# Patient Record
Sex: MALE | Race: WHITE | NOT HISPANIC OR LATINO | Employment: FULL TIME
[De-identification: names, ages, dates, MRNs, and addresses within clinical notes are randomized per-mention and may not be internally consistent; named-entity substitution may affect disease eponyms.]

---

## 2017-03-02 ENCOUNTER — RECORDS - HEALTHEAST (OUTPATIENT)
Dept: ADMINISTRATIVE | Facility: OTHER | Age: 37
End: 2017-03-02

## 2017-03-09 ENCOUNTER — COMMUNICATION - HEALTHEAST (OUTPATIENT)
Dept: TELEHEALTH | Facility: CLINIC | Age: 37
End: 2017-03-09

## 2017-03-09 ENCOUNTER — OFFICE VISIT - HEALTHEAST (OUTPATIENT)
Dept: FAMILY MEDICINE | Facility: CLINIC | Age: 37
End: 2017-03-09

## 2017-03-09 DIAGNOSIS — R25.1 TREMOR: ICD-10-CM

## 2017-03-09 DIAGNOSIS — K70.11 ALCOHOLIC HEPATITIS WITH ASCITES (H): ICD-10-CM

## 2017-03-09 DIAGNOSIS — Z11.3 SCREEN FOR STD (SEXUALLY TRANSMITTED DISEASE): ICD-10-CM

## 2017-03-09 DIAGNOSIS — I10 ESSENTIAL HYPERTENSION: ICD-10-CM

## 2017-03-09 DIAGNOSIS — Z72.0 TOBACCO ABUSE: ICD-10-CM

## 2017-03-09 DIAGNOSIS — Z00.00 WELL ADULT EXAM: ICD-10-CM

## 2017-03-09 DIAGNOSIS — F41.9 ANXIETY: ICD-10-CM

## 2017-03-09 DIAGNOSIS — F11.10 OPIATE ABUSE, EPISODIC (H): ICD-10-CM

## 2017-03-09 DIAGNOSIS — F33.2 SEVERE EPISODE OF RECURRENT MAJOR DEPRESSIVE DISORDER, WITHOUT PSYCHOTIC FEATURES (H): ICD-10-CM

## 2017-03-09 DIAGNOSIS — F10.10 ALCOHOL ABUSE: ICD-10-CM

## 2017-03-09 DIAGNOSIS — N50.89 MASS OF SCROTUM: ICD-10-CM

## 2017-03-09 LAB
CHOLEST SERPL-MCNC: 239 MG/DL
FASTING STATUS PATIENT QL REPORTED: YES
HDLC SERPL-MCNC: 53 MG/DL
HIV 1+2 AB+HIV1 P24 AG SERPL QL IA: NEGATIVE
LDLC SERPL CALC-MCNC: 166 MG/DL
TRIGL SERPL-MCNC: 101 MG/DL

## 2017-03-09 ASSESSMENT — MIFFLIN-ST. JEOR: SCORE: 1624.1

## 2017-03-10 ENCOUNTER — HOSPITAL ENCOUNTER (OUTPATIENT)
Dept: ULTRASOUND IMAGING | Facility: HOSPITAL | Age: 37
Discharge: HOME | End: 2017-03-10
Attending: FAMILY MEDICINE

## 2017-03-10 DIAGNOSIS — N50.9 DISORDER OF MALE GENITAL ORGANS, UNSPECIFIED: ICD-10-CM

## 2017-03-10 DIAGNOSIS — N50.89 MASS OF SCROTUM: ICD-10-CM

## 2017-03-10 LAB — SYPHILIS RPR SCREEN - HISTORICAL: NORMAL

## 2017-03-13 ENCOUNTER — COMMUNICATION - HEALTHEAST (OUTPATIENT)
Dept: FAMILY MEDICINE | Facility: CLINIC | Age: 37
End: 2017-03-13

## 2017-03-14 ENCOUNTER — COMMUNICATION - HEALTHEAST (OUTPATIENT)
Dept: FAMILY MEDICINE | Facility: CLINIC | Age: 37
End: 2017-03-14

## 2017-03-15 ENCOUNTER — COMMUNICATION - HEALTHEAST (OUTPATIENT)
Dept: FAMILY MEDICINE | Facility: CLINIC | Age: 37
End: 2017-03-15

## 2017-03-16 ENCOUNTER — RECORDS - HEALTHEAST (OUTPATIENT)
Dept: ADMINISTRATIVE | Facility: OTHER | Age: 37
End: 2017-03-16

## 2017-03-20 ENCOUNTER — HOSPITAL ENCOUNTER (OUTPATIENT)
Dept: ULTRASOUND IMAGING | Facility: HOSPITAL | Age: 37
Discharge: HOME | End: 2017-03-20

## 2017-03-20 DIAGNOSIS — K70.11 ALCOHOLIC HEPATITIS WITH ASCITES (H): ICD-10-CM

## 2017-06-13 ENCOUNTER — RECORDS - HEALTHEAST (OUTPATIENT)
Dept: ADMINISTRATIVE | Facility: OTHER | Age: 37
End: 2017-06-13

## 2017-06-14 ENCOUNTER — RECORDS - HEALTHEAST (OUTPATIENT)
Dept: ADMINISTRATIVE | Facility: OTHER | Age: 37
End: 2017-06-14

## 2020-08-28 ENCOUNTER — VIRTUAL VISIT (OUTPATIENT)
Dept: FAMILY MEDICINE | Facility: OTHER | Age: 40
End: 2020-08-28
Payer: COMMERCIAL

## 2020-08-28 ENCOUNTER — AMBULATORY - HEALTHEAST (OUTPATIENT)
Dept: FAMILY MEDICINE | Facility: CLINIC | Age: 40
End: 2020-08-28

## 2020-08-28 DIAGNOSIS — Z20.822 SUSPECTED COVID-19 VIRUS INFECTION: ICD-10-CM

## 2020-08-28 PROCEDURE — 99421 OL DIG E/M SVC 5-10 MIN: CPT | Performed by: NURSE PRACTITIONER

## 2020-08-28 NOTE — PROGRESS NOTES
"Date: 2020 10:51:07  Clinician: Mitzy Wyatt  Clinician NPI: [id number]  Patient: Quinton Franks  Patient : 1980  Patient Address: [address] [place] [address]  Patient Phone: [phone]  Visit Protocol: URI  Patient Summary:  Quinton is a 40 year old ( : 1980 ) male who initiated a Visit for COVID-19 (Coronavirus) evaluation and screening. When asked the question \"Please sign me up to receive news, health information and promotions from [employer].\", Quinton responded \"No\".    Quinton states his symptoms started gradually 3-4 days ago.   His symptoms consist of a headache, malaise, a cough, nasal congestion, and myalgia. He is experiencing mild difficulty breathing with activities but can speak normally in full sentences.   Symptom details     Nasal secretions: The color of his mucus is white and yellow.    Cough: Quinton coughs every 5-10 minutes and his cough is not more bothersome at night. Phlegm does not come into his throat when he coughs. He does not believe his cough is caused by post-nasal drip.     Headache: He states the headache is mild (1-3 on a 10 point pain scale).      Quinton denies having ear pain, chills, fever, wheezing, sore throat, teeth pain, ageusia, diarrhea, vomiting, rhinitis, nausea, anosmia, and facial pain or pressure. He also denies having a sinus infection within the past year, having recent facial or sinus surgery in the past 60 days, taking antibiotic medication in the past month, and double sickening (worsening symptoms after initial improvement).   Precipitating events  He has not recently been exposed to someone with influenza. Quinton has been in close contact with the following high risk individuals: children under the age of 5 and adults 65 or older.   Pertinent COVID-19 (Coronavirus) information  In the past 14 days, Quinton has not worked in a congregate living setting.   He does not work or volunteer as healthcare worker or a  and does not " work or volunteer in a healthcare facility.   Quinton also has not lived in a congregate living setting in the past 14 days. He does not live with a healthcare worker.   Quinton has had a close contact with a laboratory-confirmed COVID-19 patient within 14 days of symptom onset.   Since December 2019, Quinton and has not had upper respiratory infection or influenza-like illness. Has not been diagnosed with lab-confirmed COVID-19 test   Pertinent medical history  Quinton needs a return to work/school note.   Weight: 195 lbs   Quinton smokes or uses smokeless tobacco.   Weight: 195 lbs    MEDICATIONS: No current medications, ALLERGIES: NKDA  Clinician Response:  Dear DULCE Alcantara  Your symptoms show that you may have coronavirus (COVID-19). This illness can cause fever, cough and trouble breathing. Many people get a mild case and get better on their own. Some people can get very sick.  What should I do?  We would like to test you for this virus.   1. Please call [phone] to schedule your visit. Explain that you were referred by [facility] to have a COVID-19 test. Be ready to share your [facility] visit ID number.  The following will serve as your written order for this COVID Test, ordered by me, for the indication of suspected COVID [Z20.828]: The test will be ordered in [employer], our electronic health record, after you are scheduled. It will show as ordered and authorized by Konstantin Kitchen MD.  Order: COVID-19 (Coronavirus) PCR for SYMPTOMATIC testing from [facility].      2. When it's time for your COVID test:  Stay at least 6 feet away from others. (If someone will drive you to your test, stay in the backseat, as far away from the  as you can.)   Cover your mouth and nose with a mask, tissue or washcloth.  Go straight to the testing site. Don't make any stops on the way there or back.      3.Starting now: Stay home and away from others (self-isolate) until:   You've had no fever---and no medicine that reduces fever---for one full day  "(24 hours). And...   Your other symptoms have gotten better. For example, your cough or breathing has improved. And...   At least 10 days have passed since your symptoms started.       During this time, don't leave the house except for testing or medical care.   Stay in your own room, even for meals. Use your own bathroom if you can.   Stay away from others in your home. No hugging, kissing or shaking hands. No visitors.  Don't go to work, school or anywhere else.    Clean \"high touch\" surfaces often (doorknobs, counters, handles, etc.). Use a household cleaning spray or wipes. You'll find a full list of  on the EPA website: www.epa.gov/pesticide-registration/list-n-disinfectants-use-against-sars-cov-2.   Cover your mouth and nose with a mask, tissue or washcloth to avoid spreading germs.  Wash your hands and face often. Use soap and water.  Caregivers in these groups are at risk for severe illness due to COVID-19:  o People 65 years and older  o People who live in a nursing home or long-term care facility  o People with chronic disease (lung, heart, cancer, diabetes, kidney, liver, immunologic)  o People who have a weakened immune system, including those who:   Are in cancer treatment  Take medicine that weakens the immune system, such as corticosteroids  Had a bone marrow or organ transplant  Have an immune deficiency  Have poorly controlled HIV or AIDS  Are obese (body mass index of 40 or higher)  Smoke regularly   o Caregivers should wear gloves while washing dishes, handling laundry and cleaning bedrooms and bathrooms.  o Use caution when washing and drying laundry: Don't shake dirty laundry, and use the warmest water setting that you can.  o For more tips, go to www.cdc.gov/coronavirus/2019-ncov/downloads/10Things.pdf.    4.Sign up for [employer]. We know it's scary to hear that you might have COVID-19. We want to track your symptoms to make sure you're okay over the next 2 weeks. Please look for an " email from [employer]---this is a free, online program that we'll use to keep in touch. To sign up, follow the link in the email. Learn more at [email or web address]  How can I take care of myself?   Get lots of rest. Drink extra fluids (unless a doctor has told you not to).   Take Tylenol (acetaminophen) for fever or pain. If you have liver or kidney problems, ask your family doctor if it's okay to take Tylenol.   Adults can take either:    650 mg (two 325 mg pills) every 4 to 6 hours, or...   1,000 mg (two 500 mg pills) every 8 hours as needed.    Note: Don't take more than 3,000 mg in one day. Acetaminophen is found in many medicines (both prescribed and over-the-counter medicines). Read all labels to be sure you don't take too much.   For children, check the Tylenol bottle for the right dose. The dose is based on the child's age or weight.    If you have other health problems (like cancer, heart failure, an organ transplant or severe kidney disease): Call your specialty clinic if you don't feel better in the next 2 days.       Know when to call 911. Emergency warning signs include:    Trouble breathing or shortness of breath Pain or pressure in the chest that doesn't go away Feeling confused like you haven't felt before, or not being able to wake up Bluish-colored lips or face.  Where can I get more information?   [facility] -- About COVID-19: [email or web address]   CDC -- What to Do If You're Sick: www.cdc.gov/coronavirus/2019-ncov/about/steps-when-sick.html   CDC -- Ending Home Isolation: www.cdc.gov/coronavirus/2019-ncov/hcp/disposition-in-home-patients.html   CDC -- Caring for Someone: www.cdc.gov/coronavirus/2019-ncov/if-you-are-sick/care-for-someone.html   [facility] -- Interim Guidance for Hospital Discharge to Home: www.health.[facility].[place].us/diseases/coronavirus/hcp/hospdischarge.pdf   [facility] clinical trials (COVID-19 research studies):  [email or web address]-clinical-trials    Below are the COVID-19 hotlines at the [place] Department of Health ([facility]). Interpreters are available.    For health questions: Call 840-107-3255 or 1-607.878.1613 (7 a.m. to 7 p.m.) For questions about schools and childcare: Call 148-228-3074 or 1-131.322.1205 (7 a.m. to 7 p.m.)    Diagnosis: Other malaise  Diagnosis ICD: R53.81

## 2020-08-29 ENCOUNTER — AMBULATORY - HEALTHEAST (OUTPATIENT)
Dept: FAMILY MEDICINE | Facility: CLINIC | Age: 40
End: 2020-08-29

## 2020-08-29 DIAGNOSIS — Z20.822 SUSPECTED COVID-19 VIRUS INFECTION: ICD-10-CM

## 2020-09-01 ENCOUNTER — COMMUNICATION - HEALTHEAST (OUTPATIENT)
Dept: SCHEDULING | Facility: CLINIC | Age: 40
End: 2020-09-01

## 2021-03-05 ENCOUNTER — OFFICE VISIT - HEALTHEAST (OUTPATIENT)
Dept: FAMILY MEDICINE | Facility: CLINIC | Age: 41
End: 2021-03-05

## 2021-03-05 DIAGNOSIS — K21.9 GASTROESOPHAGEAL REFLUX DISEASE WITHOUT ESOPHAGITIS: ICD-10-CM

## 2021-03-05 DIAGNOSIS — Z72.0 TOBACCO ABUSE: ICD-10-CM

## 2021-03-05 DIAGNOSIS — Z23 NEED FOR TETANUS BOOSTER: ICD-10-CM

## 2021-03-05 DIAGNOSIS — F10.11 ALCOHOL ABUSE, IN REMISSION: ICD-10-CM

## 2021-03-05 ASSESSMENT — MIFFLIN-ST. JEOR: SCORE: 1798.17

## 2021-05-18 ENCOUNTER — RECORDS - HEALTHEAST (OUTPATIENT)
Dept: ADMINISTRATIVE | Facility: OTHER | Age: 41
End: 2021-05-18

## 2021-05-18 ENCOUNTER — OFFICE VISIT - HEALTHEAST (OUTPATIENT)
Dept: FAMILY MEDICINE | Facility: CLINIC | Age: 41
End: 2021-05-18

## 2021-05-18 DIAGNOSIS — Z00.00 ROUTINE MEDICAL EXAM: ICD-10-CM

## 2021-05-18 DIAGNOSIS — F10.11 ALCOHOL ABUSE, IN REMISSION: ICD-10-CM

## 2021-05-18 DIAGNOSIS — E78.1 PURE HYPERGLYCERIDEMIA: ICD-10-CM

## 2021-05-18 DIAGNOSIS — I10 ESSENTIAL HYPERTENSION: ICD-10-CM

## 2021-05-18 DIAGNOSIS — K21.9 GASTROESOPHAGEAL REFLUX DISEASE WITHOUT ESOPHAGITIS: ICD-10-CM

## 2021-05-18 LAB
ALBUMIN SERPL-MCNC: 4.3 G/DL (ref 3.5–5)
ALP SERPL-CCNC: 103 U/L (ref 45–120)
ALT SERPL W P-5'-P-CCNC: 31 U/L (ref 0–45)
ANION GAP SERPL CALCULATED.3IONS-SCNC: 12 MMOL/L (ref 5–18)
AST SERPL W P-5'-P-CCNC: 26 U/L (ref 0–40)
BILIRUB SERPL-MCNC: 0.5 MG/DL (ref 0–1)
BUN SERPL-MCNC: 14 MG/DL (ref 8–22)
CALCIUM SERPL-MCNC: 9.7 MG/DL (ref 8.5–10.5)
CHLORIDE BLD-SCNC: 104 MMOL/L (ref 98–107)
CHOLEST SERPL-MCNC: 283 MG/DL
CO2 SERPL-SCNC: 23 MMOL/L (ref 22–31)
CREAT SERPL-MCNC: 0.9 MG/DL (ref 0.7–1.3)
FASTING STATUS PATIENT QL REPORTED: YES
GFR SERPL CREATININE-BSD FRML MDRD: >60 ML/MIN/1.73M2
GLUCOSE BLD-MCNC: 100 MG/DL (ref 70–125)
HBA1C MFR BLD: 5.5 %
HDLC SERPL-MCNC: 35 MG/DL
LDLC SERPL CALC-MCNC: 179 MG/DL
LDLC SERPL CALC-MCNC: 182 MG/DL
POTASSIUM BLD-SCNC: 4.9 MMOL/L (ref 3.5–5)
PROT SERPL-MCNC: 7.4 G/DL (ref 6–8)
SODIUM SERPL-SCNC: 139 MMOL/L (ref 136–145)
TRIGL SERPL-MCNC: 331 MG/DL
TSH SERPL DL<=0.005 MIU/L-ACNC: 1.56 UIU/ML (ref 0.3–5)

## 2021-05-18 ASSESSMENT — ANXIETY QUESTIONNAIRES
6. BECOMING EASILY ANNOYED OR IRRITABLE: NOT AT ALL
4. TROUBLE RELAXING: NOT AT ALL
GAD7 TOTAL SCORE: 0
3. WORRYING TOO MUCH ABOUT DIFFERENT THINGS: NOT AT ALL
3. WORRYING TOO MUCH ABOUT DIFFERENT THINGS: NOT AT ALL
4. TROUBLE RELAXING: NOT AT ALL
7. FEELING AFRAID AS IF SOMETHING AWFUL MIGHT HAPPEN: NOT AT ALL
1. FEELING NERVOUS, ANXIOUS, OR ON EDGE: NOT AT ALL
6. BECOMING EASILY ANNOYED OR IRRITABLE: NOT AT ALL
7. FEELING AFRAID AS IF SOMETHING AWFUL MIGHT HAPPEN: NOT AT ALL
5. BEING SO RESTLESS THAT IT IS HARD TO SIT STILL: NOT AT ALL
2. NOT BEING ABLE TO STOP OR CONTROL WORRYING: NOT AT ALL
2. NOT BEING ABLE TO STOP OR CONTROL WORRYING: NOT AT ALL
5. BEING SO RESTLESS THAT IT IS HARD TO SIT STILL: NOT AT ALL
1. FEELING NERVOUS, ANXIOUS, OR ON EDGE: NOT AT ALL

## 2021-05-18 ASSESSMENT — PATIENT HEALTH QUESTIONNAIRE - PHQ9: SUM OF ALL RESPONSES TO PHQ QUESTIONS 1-9: 2

## 2021-05-18 ASSESSMENT — MIFFLIN-ST. JEOR: SCORE: 1786.71

## 2021-05-27 VITALS — HEIGHT: 68 IN

## 2021-05-27 VITALS — HEART RATE: 72 BPM | SYSTOLIC BLOOD PRESSURE: 112 MMHG | DIASTOLIC BLOOD PRESSURE: 82 MMHG

## 2021-05-27 VITALS — WEIGHT: 197.8 LBS | BODY MASS INDEX: 30.08 KG/M2

## 2021-05-30 VITALS — BODY MASS INDEX: 24.81 KG/M2 | HEIGHT: 68 IN | WEIGHT: 163.7 LBS

## 2021-06-09 NOTE — PROGRESS NOTES
Assessment:      Healthy male exam.    1. Well adult exam  Lipid Cascade   2. Alcohol abuse  Comprehensive Metabolic Panel   3. Alcoholic hepatitis with ascites     4. Severe episode of recurrent major depressive disorder, without psychotic features     5. Anxiety     6. Essential hypertension     7. Tremor     8. Mass of scrotum  US Scrotum and Testicles W Duplex Ltd   9. Screen for STD (sexually transmitted disease)  Chlamydia trachomatis & Neisseria gonorrhoeae, Amplified Detection    HIV Antigen/Antibody Screening [place]    Syphilis Screen, Cascade   10. Tobacco abuse     11. Opiate abuse, episodic       The following are part of a depression follow up plan for the patient:  under care of mental health team  The following high BMI interventions were performed this visit: encouragement to exercise  I have counseled the patient for tobacco cessation and the follow up will occur  at the next visit.     Plan:      I recommended that he continue the inpatient rehab program to [facility].  He reports that he is seeing mental health providers that help manage his medications.  I recommended that we check the above labs including STD screening labs.  I also recommended that he have an ultrasound of the mass in his left scrotum.  His blood pressure appears stable.  We will attempt to get old records to review.     All questions answered.  Discussed healthy lifestyle modifications.  Follow up as needed.     Subjective:      Quinton Franks is a 36 y.o. male who presents for an annual exam. The patient reports that there is not domestic violence in his life.  He has a medical history significant for alcohol and opiate abuse.  He reports that he is currently doing inpatient rehab at [facility] in [place].  He has been there for 1 week.  He describes being at [facility] for a period of time last fall, but relapsed.  He reports that his medical history significant for anxiety, depression, hypertension, tremor and alcoholic  "hepatitis with ascites that was diagnosed last fall.  He reports that 3 L of fluid were drained last October.  He sees GI every 3 months.  He has a follow-up appointment with him next week.  He is sexually active and has never had STD screening before.  He reports that hepatitis screening was negative in the past.  He is also concerned about a mass that he noticed in the left scrotum a few weeks ago.  It is not tender.  It has not significantly changed in size.    Social history: He has been drinking alcohol heavily since his early 20s.  He reports very heavy drinking \"all day everyday\" for the past 5 years.  She used to work as a .    Healthy Habits:   Regular Exercise: Yes  Sunscreen Use: N/A  Healthy Diet: Yes  Dental Visits Regularly: No  Seat Belt: Yes  Sexually active: Yes  Monthly Self Testicular Exams:  Yes  Hemoccults: No  Flex Sig: No  Colonoscopy: No  Lipid Profile: Yes  Glucose Screen: Yes  Prevention of Osteoporosis: No  Last Dexa: N/A        There is no immunization history on file for this patient.  Immunization status: Refuses Immunization. He is due for Tdap and influenza.    No exam data present    Current Outpatient Prescriptions   Medication Sig Dispense Refill     FLUoxetine (PROZAC) 40 MG capsule   0     gabapentin (NEURONTIN) 600 MG tablet 600 mg. Take three times daily  0     propranolol (INDERAL) 10 MG tablet   0     SUBOXONE 8-2 mg Film per sublingual film   0     No current facility-administered medications for this visit.      No past medical history on file.  No past surgical history on file.  Review of patient's allergies indicates no known allergies.  Family History   Problem Relation Age of Onset     Hypertension Mother      Alcohol abuse Mother      Depression Mother      Hypertension Father      Social History     Social History     Marital status: Single     Spouse name: N/A     Number of children: N/A     Years of education: N/A     Occupational History     Not on file. " "    Social History Main Topics     Smoking status: Current Every Day Smoker     Smokeless tobacco: Not on file     Alcohol use Not on file     Drug use: Not on file     Sexual activity: Not on file     Other Topics Concern     Not on file     Social History Narrative     No narrative on file       Review of Systems  General:  Denies problem  Eyes: Denies problem  Ears/Nose/Throat: Denies problem  Cardiovascular: Denies problem  Respiratory:  Denies problem  Gastrointestinal:  Denies problem  Genitourinary: Denies problem  Musculoskeletal:  Denies problem  Skin: Denies problem  Neurologic: Denies problem  Psychiatric: Denies problem  Endocrine: Denies problem  Heme/Lymphatic: Denies problem   Allergic/Immunologic: Denies problem        Objective:     Vitals:    03/09/17 1231   BP: 122/84   Pulse: 84   Resp: 18   Weight: 163 lb 11.2 oz (74.3 kg)   Height: 5' 7.5\" (1.715 m)     Body mass index is 25.26 kg/(m^2).    Physical  General Appearance: Alert, cooperative, no distress, appears stated age  Head: Normocephalic, without obvious abnormality, atraumatic  Eyes: PERRL, conjunctiva/corneas clear, EOM's intact  Ears: Normal TM's and external ear canals, both ears  Nose: Nares normal, septum midline,mucosa normal, no drainage  Throat: Lips, mucosa, and tongue normal; teeth and gums normal  Neck: Supple, symmetrical, trachea midline, no adenopathy;  thyroid: not enlarged, symmetric, no tenderness/mass/nodules  Back: Symmetric, no curvature, ROM normal, no CVA tenderness  Lungs: Clear to auscultation bilaterally, respirations unlabored  Heart: Regular rate and rhythm, S1 and S2 normal, no murmur, rub, or gallop,  Chest: There are symmetric palpable nodules under both nipples which are movable and nontender  Abdomen: Soft, non-tender, bowel sounds active all four quadrants,  no masses, no organomegaly  Genitourinary: Penis normal. Right testis is descended. Left testis is descended.  There is a palpable mass that is movable " in the posterior left upper scrotum which is nontender to palpation.  No hernias palpated  Musculoskeletal: Normal range of motion. No joint swelling or deformity.   Extremities: Extremities normal, atraumatic, no cyanosis or edema.  Tremor present  Skin: Skin color, texture, turgor normal, no rashes or lesions  Lymph nodes: Cervical, supraclavicular, and axillary nodes normal  Neurologic: He is alert. He has normal reflexes.   Psychiatric: He has a normal mood and affect.

## 2021-06-15 PROBLEM — F10.11 ALCOHOL ABUSE, IN REMISSION: Status: ACTIVE | Noted: 2017-03-09

## 2021-06-15 PROBLEM — Z72.0 TOBACCO ABUSE: Status: ACTIVE | Noted: 2017-03-09

## 2021-06-15 PROBLEM — F41.9 ANXIETY: Status: ACTIVE | Noted: 2017-03-09

## 2021-06-15 PROBLEM — I10 ESSENTIAL HYPERTENSION: Status: ACTIVE | Noted: 2017-03-09

## 2021-06-15 PROBLEM — R25.1 TREMOR: Status: ACTIVE | Noted: 2017-03-09

## 2021-06-15 NOTE — PROGRESS NOTES
"    Assessment & Plan     Gastroesophageal reflux disease without esophagitis    We will have him continue the esomeprazole daily.  He may very well have a bit of gastritis or reflux going on here.  We talked about how the medication works and how he may need to take it for a while to get full effect of it.  He will follow-up with me the next day visit and see how he is doing with that.    Tobacco abuse    We did talk about smoking a bit and how we can talk about that down the road when he would like to try to get off of that.  He has done a nice job of being off of alcohol now and being off of opioids so at some point it would be nice to get him off of this as well.    Alcohol abuse, in remission    As mentioned he is sober now for several years and he was congratulated on that.  He had had some medical problems related to his drug and alcohol use and those seem to be improving.    Need for tetanus booster    - Tdap vaccine,  8yo or older,  IM      Review of prior external note(s) from - other providers    Tobacco Cessation:   reports that he has been smoking. He has never used smokeless tobacco.  I have counseled the patient for tobacco cessation and the follow up will occur  at the next visit.  BMI:   Estimated body mass index is 30.82 kg/m  as calculated from the following:    Height as of this encounter: 5' 7.75\" (1.721 m).    Weight as of this encounter: 201 lb 3.2 oz (91.3 kg).       No follow-ups on file.    Enrique Sands MD  [facility]   Quinton Franks is 40 y.o. and presents today for the following health issues   HPI     Patient here to establish care with us and to follow-up from a recent visit to the ED.  He went in with some abdominal discomfort dilated about a test on him and came up with that he probably has some gastroesophageal reflux and/or gastritis.  He was put on some Nexium and has done well with that and he already starts to notice a bit of an " "improvement with that.  He knows of no food pattern to this issue.  He has had no bowel or bladder changes.  He said no side effects or problems with the Nexium.    We also spent some time reviewing his history today which includes opioid abuse and alcohol abuse but he has been clean and sober for a while now and he was congratulated on that and has no intention of going back.    He does still smoke cigarettes daily and hopes hopeful at some point that he will be able to get off of those as well.    Review of Systems    Patient is new patient to the clinic. Please see past medical history, surgical history, social history and family history, all of which were completed in their entirety today.       Objective    /70 (Patient Site: Left Arm, Patient Position: Sitting, Cuff Size: Adult Regular)   Pulse 81   Ht 5' 7.75\" (1.721 m)   Wt 201 lb 3.2 oz (91.3 kg)   SpO2 98%   BMI 30.82 kg/m    Body mass index is 30.82 kg/m .  Physical Exam    Well-appearing male no acute distress.  Vital signs as noted.  Chest clear to auscultation.  Heart regular rate and rhythm.  Abdomen soft nontender nondistended muscles present all quadrants.  Extremities without edema.            "

## 2021-06-16 PROBLEM — K21.9 GASTROESOPHAGEAL REFLUX DISEASE WITHOUT ESOPHAGITIS: Status: ACTIVE | Noted: 2021-03-05

## 2021-06-19 ENCOUNTER — HEALTH MAINTENANCE LETTER (OUTPATIENT)
Age: 41
End: 2021-06-19

## 2021-06-30 NOTE — PROGRESS NOTES
Progress Notes by Enrique Sands MD at 5/18/2021  8:00 AM     Author: Enrique Sands MD Service: -- Author Type: Physician    Filed: 5/20/2021  6:53 AM Encounter Date: 5/18/2021 Status: Signed    : Enrique Sands MD (Physician)       MALE PREVENTATIVE EXAM    Assessment and Plan:     Patient has been advised of split billing requirements and indicates understanding: Yes    1. Routine medical exam    Generally the patient is doing very well.  We discussed healthy lifestyles, including adequate exercise (3-4 times a week for 20-30 minutes), and a healthy diet.  Patient should return for annual physicals, and we can also see them here as needed.       2. Essential Hypertriglyceridemia    We get some labs today as listed up below and see where his cholesterol is at.  He has had a very high triglyceride in the past, so we get an LDL direct so we can assess that risk factor a bit better.  We can follow-up on the results of the become available.  We talked about diet and exercise as an important part of all of this as well.      - Lipid Profile  - Thyroid Stimulating Hormone (TSH)  - Glycosylated Hemoglobin A1c  - LDL Cholesterol, Direct    3. Alcohol abuse, in remission    He was congratulated on his sobriety and he feels pretty pleased about that.  No intentions of going back and feels quite good about it.      - Comprehensive Metabolic Panel    4. Essential hypertension    Blood pressure is very good today with no medications on board.  We can continue to watch this going forward and treat accordingly.    5. Gastroesophageal reflux disease without esophagitis    The Nexium seems to help his reflux well, so we will continue that medicine as well.    Next follow up:  No follow-ups on file.    Immunization Review  Adult Imm Review: No immunizations due today      I discussed the following with the patient:   Adult Healthy Living: Importance of regular exercise  Healthy nutrition  Getting adequate sleep  Stress  management  Use of seat belts        Subjective:   Chief Complaint: Quinton Franks is an 41 y.o. male here for a preventative health visit.    Patient has been advised of split billing requirements and indicates understanding: Yes  HPI: Patient here today for a physical.  He is generally doing pretty well and has no major concerns or problems.  He does have a couple of chronic issues that we did cover today and advised.    He has essential hypertriglyceridemia and has not had this checked in quite some time.  He has known that his triglycerides have been high but has not really had an LDL to assess that risk factor.  He tries to eat well but knows that it is in his family significantly.    He has quit drinking and he is pleased about that.  He has no intentions of going back to doing that as he is trying to change around his life a bit to get things a bit better for himself.    He has essential hypertension but does not actually require any medication since he has stopped drinking.    He also has some reflux and the Nexium really works well for that.    Healthy Habits  Are you taking a daily aspirin? No  Do you typically exercising at least 40 min, 3-4 times per week?  Yes  Do you usually eat at least 4 servings of fruit and vegetables a day, include whole grains and fiber and avoid regularly eating high fat foods? NO  Have you had an eye exam in the past two years? NO  Do you see a dentist twice per year? Yes  Do you have any concerns regarding sleep? No    Safety Screen  If you own firearms, are they secured in a locked gun cabinet or with trigger locks? Yes  Do you feel you are safe where you are living?: Yes (5/18/2021  7:26 AM)  Do you feel you are safe in your relationship(s)?: Yes (5/18/2021  7:26 AM)      Review of Systems:  Please see above.  The rest of the review of systems are negative for all systems.     Cancer Screening     Patient has no health maintenance due at this time          Patient Care  "Team:  Enrique Sands MD as PCP - General (Family Medicine)  Enrique Sands MD as Assigned PCP        History     Reviewed By Date/Time Sections Reviewed    Enrique Sands MD 5/18/2021  8:20 AM Medical, Surgical, Tobacco, Alcohol, Drug Use, Sexual Activity, Family    Clari Dang [facility] 5/18/2021  7:26 AM Tobacco            Objective:   Vital Signs:   Visit Vitals  /82 (Patient Site: Left Arm, Patient Position: Sitting, Cuff Size: Adult Large)   Pulse 72   Ht 5' 8\" (1.727 m)   Wt 197 lb 12.8 oz (89.7 kg)   BMI 30.08 kg/m           PHYSICAL EXAM    Well developed, well nourished, no acute distress.  HEENT: normocephalic/atraumatic, PERRLA/EOMI, TMs: Gray, normal light reflex, no nasal discharge.  Oral mucosa: no erythema/exudate  Neck: No LAD/masses/thyromegaly/bruits  Lungs: clear bilaterally  Heart: regular rate and rhythm, no murmurs/gallops/rubs.  Abdomen: Normal bowel sounds, soft, non-tender, non-distended, no masses, neg Aguila's/McBurney's, no rebound/guarding  Genital: Bilaterally descended testes, no masses, non-tender, no hernia.  No urethral discharge or erythema.  No lesions, normal phallus.  Lymphatics: no supraclavicular/axillary/epitrochlear/inguinal LAD. No edema.  Neuro: A&O x 3, CN II-XII intact, strength 5/5, reflexes symmetric, sensory intact to light touch.  Psych: Behavior appropriate, engaging.  Thought processes congruent, non-tangential.  Musculoskeletal: no gross deformities.  Skin: no rashes or lesions.                Medication List          Accurate as of May 18, 2021 11:59 PM. If you have any questions, ask your nurse or doctor.            CONTINUE taking these medications    FISH OIL ORAL  INSTRUCTIONS: Take by mouth.        NexIUM 24HR 20 MG capsule  INSTRUCTIONS: Take 20 mg by mouth daily before breakfast.  Generic drug: esomeprazole               Additional Screenings Completed Today:          "

## 2021-07-04 NOTE — ADDENDUM NOTE
Addendum Note by Enrique Sands MD at 3/5/2021 10:30 AM     Author: Enrique Sands MD Service: -- Author Type: Physician    Filed: 3/8/2021  8:37 AM Encounter Date: 3/5/2021 Status: Signed    : Enrique Sands MD (Physician)    Addended by: ENRIQUE SANDS on: 3/8/2021 08:37 AM        Modules accepted: Level of Service

## 2021-07-14 PROBLEM — K70.11 ALCOHOLIC HEPATITIS WITH ASCITES (H): Status: RESOLVED | Noted: 2017-03-09 | Resolved: 2021-03-05

## 2021-08-03 PROBLEM — F11.10 OPIATE ABUSE, EPISODIC (H): Status: RESOLVED | Noted: 2017-03-09 | Resolved: 2021-03-05

## 2021-10-09 ENCOUNTER — HEALTH MAINTENANCE LETTER (OUTPATIENT)
Age: 41
End: 2021-10-09

## 2021-12-28 ENCOUNTER — OFFICE VISIT (OUTPATIENT)
Dept: FAMILY MEDICINE | Facility: CLINIC | Age: 41
End: 2021-12-28
Payer: COMMERCIAL

## 2021-12-28 VITALS
OXYGEN SATURATION: 98 % | TEMPERATURE: 98.5 F | WEIGHT: 190.1 LBS | RESPIRATION RATE: 16 BRPM | DIASTOLIC BLOOD PRESSURE: 73 MMHG | BODY MASS INDEX: 28.9 KG/M2 | SYSTOLIC BLOOD PRESSURE: 126 MMHG | HEART RATE: 96 BPM

## 2021-12-28 DIAGNOSIS — J02.9 VIRAL PHARYNGITIS: Primary | ICD-10-CM

## 2021-12-28 LAB
DEPRECATED S PYO AG THROAT QL EIA: NEGATIVE
GROUP A STREP BY PCR: NOT DETECTED

## 2021-12-28 PROCEDURE — 99213 OFFICE O/P EST LOW 20 MIN: CPT | Performed by: PHYSICIAN ASSISTANT

## 2021-12-28 PROCEDURE — 87651 STREP A DNA AMP PROBE: CPT | Performed by: PHYSICIAN ASSISTANT

## 2021-12-28 NOTE — PROGRESS NOTES
URGENT CARE VISIT:    SUBJECTIVE:   Quinton Franks is a 41 year old male presenting with a chief complaint of sore throat.  Onset was 5 day(s) ago. Sore throat radiates to right side of face and head.  He denies the following symptoms: fever, chills, stuffy nose, cough - non-productive, vomiting and diarrhea  Course of illness is same.    Treatment measures tried include None tried with no relief of symptoms.  Predisposing factors include None.    PMH:   Past Medical History:   Diagnosis Date     Alcoholic hepatitis with ascites 3/9/2017     Opiate abuse, episodic (H) 3/9/2017     Severe episode of recurrent major depressive disorder, without psychotic features (H)     Created by Conversion  Replacement Utility updated for latest IMO load     Allergies: Patient has no known allergies.   Medications:   Current Outpatient Medications   Medication Sig Dispense Refill     docosahexaenoic acid/epa (FISH OIL ORAL) [DOCOSAHEXAENOIC ACID/EPA (FISH OIL ORAL)] Take by mouth. (Patient not taking: Reported on 12/28/2021)       esomeprazole (NEXIUM 24HR) 20 MG capsule [ESOMEPRAZOLE (NEXIUM 24HR) 20 MG CAPSULE] Take 20 mg by mouth daily before breakfast. (Patient not taking: Reported on 12/28/2021)       Social History:   Social History     Tobacco Use     Smoking status: Current Every Day Smoker     Packs/day: 0.50     Smokeless tobacco: Never Used   Substance Use Topics     Alcohol use: Not Currently     Comment: Alcoholic Drinks/day: sober 9/1/2018       ROS:  Review of systems negative except as stated above.    OBJECTIVE:  /73 (BP Location: Left arm, Patient Position: Sitting, Cuff Size: Adult Regular)   Pulse 96   Temp 98.5  F (36.9  C) (Oral)   Resp 16   Wt 86.2 kg (190 lb 1.6 oz)   SpO2 98%   BMI 28.90 kg/m    GENERAL APPEARANCE: healthy, alert and no distress  EYES: EOMI,  PERRL, conjunctiva clear  HENT: ear canals and TM's normal.  Mildly erythematous oropharynx. Uvula midline.  NECK: supple, nontender, no  lymphadenopathy  RESP: lungs clear to auscultation - no rales, rhonchi or wheezes  CV: regular rates and rhythm, normal S1 S2, no murmur noted  SKIN: no suspicious lesions or rashes    Labs:    Results for orders placed or performed in visit on 12/28/21   Streptococcus A Rapid Screen w/Reflex to PCR - Clinic Collect     Status: Normal    Specimen: Throat; Swab   Result Value Ref Range    Group A Strep antigen Negative Negative       ASSESSMENT:    ICD-10-CM    1. Viral pharyngitis  J02.9 Streptococcus A Rapid Screen w/Reflex to PCR - Clinic Collect     Group A Streptococcus PCR Throat Swab       PLAN:  Patient Instructions   Patient was educated on the natural course of viral throat infection. COVID PCR is pending. Conservative measures discussed including warm fluids, salt water gargles, Lozenges (Cepacol), and over-the-counter analgesics (Tylenol or Ibuprofen). See your primary care provider if symptoms worsen or do not improve in 7 days. Seek emergency care if you develop severe throat pain, or difficulty swallowing.     Patient verbalized understanding and is agreeable to plan. The patient was discharged ambulatory and in stable condition.    Rosalia Bateman PA-C ....................  12/28/2021   11:00 AM

## 2021-12-28 NOTE — PATIENT INSTRUCTIONS
Patient was educated on the natural course of viral throat infection. Conservative measures discussed including warm fluids, salt water gargles, Lozenges (Cepacol), and over-the-counter analgesics (Tylenol or Ibuprofen). See your primary care provider if symptoms worsen or do not improve in 7 days. Seek emergency care if you develop severe throat pain, or difficulty swallowing.

## 2022-01-18 VITALS
BODY MASS INDEX: 29.98 KG/M2 | HEIGHT: 68 IN | WEIGHT: 197.8 LBS | SYSTOLIC BLOOD PRESSURE: 112 MMHG | DIASTOLIC BLOOD PRESSURE: 82 MMHG | HEART RATE: 72 BPM

## 2022-01-18 VITALS
OXYGEN SATURATION: 98 % | HEIGHT: 68 IN | WEIGHT: 201.2 LBS | SYSTOLIC BLOOD PRESSURE: 126 MMHG | HEART RATE: 81 BPM | DIASTOLIC BLOOD PRESSURE: 70 MMHG | BODY MASS INDEX: 30.49 KG/M2

## 2022-01-18 ASSESSMENT — PATIENT HEALTH QUESTIONNAIRE - PHQ9: SUM OF ALL RESPONSES TO PHQ QUESTIONS 1-9: 2

## 2022-01-19 ASSESSMENT — ANXIETY QUESTIONNAIRES: GAD7 TOTAL SCORE: 0

## 2022-07-10 ENCOUNTER — HEALTH MAINTENANCE LETTER (OUTPATIENT)
Age: 42
End: 2022-07-10

## 2022-09-11 ENCOUNTER — HEALTH MAINTENANCE LETTER (OUTPATIENT)
Age: 42
End: 2022-09-11

## 2023-10-07 ENCOUNTER — HEALTH MAINTENANCE LETTER (OUTPATIENT)
Age: 43
End: 2023-10-07

## 2024-11-30 ENCOUNTER — HEALTH MAINTENANCE LETTER (OUTPATIENT)
Age: 44
End: 2024-11-30